# Patient Record
Sex: FEMALE | Race: WHITE | NOT HISPANIC OR LATINO | Employment: FULL TIME | URBAN - METROPOLITAN AREA
[De-identification: names, ages, dates, MRNs, and addresses within clinical notes are randomized per-mention and may not be internally consistent; named-entity substitution may affect disease eponyms.]

---

## 2023-02-04 PROBLEM — R22.0 FACIAL SWELLING: Status: ACTIVE | Noted: 2023-02-04

## 2023-02-04 PROBLEM — M25.50 ARTHRALGIA OF MULTIPLE JOINTS: Status: ACTIVE | Noted: 2023-02-04

## 2023-02-04 PROBLEM — M62.561 MUSCLE WASTING AND ATROPHY, NOT ELSEWHERE CLASSIFIED, RIGHT LOWER LEG: Status: ACTIVE | Noted: 2023-02-04

## 2023-02-04 PROBLEM — R22.0 TONGUE SWELLING: Status: ACTIVE | Noted: 2023-02-04

## 2023-02-04 PROBLEM — F40.298 FEAR OF FALLING: Status: ACTIVE | Noted: 2023-02-04

## 2023-02-04 PROBLEM — M54.50 LOW BACK PAIN: Status: ACTIVE | Noted: 2023-02-04

## 2023-02-04 RX ORDER — PREDNISONE 10 MG/1
TABLET ORAL
COMMUNITY
Start: 2022-09-12 | End: 2023-03-20 | Stop reason: ALTCHOICE

## 2023-02-04 RX ORDER — MULTIVITAMIN
1 TABLET ORAL DAILY
COMMUNITY
Start: 2022-03-17

## 2023-03-19 ASSESSMENT — PROMIS GLOBAL HEALTH SCALE
RATE_AVERAGE_PAIN: 0
RATE_SOCIAL_SATISFACTION: EXCELLENT
CARRYOUT_PHYSICAL_ACTIVITIES: COMPLETELY
RATE_PHYSICAL_HEALTH: VERY GOOD
EMOTIONAL_PROBLEMS: RARELY
CARRYOUT_SOCIAL_ACTIVITIES: EXCELLENT
RATE_GENERAL_HEALTH: VERY GOOD
RATE_QUALITY_OF_LIFE: VERY GOOD
RATE_MENTAL_HEALTH: EXCELLENT

## 2023-03-20 ENCOUNTER — OFFICE VISIT (OUTPATIENT)
Dept: PRIMARY CARE | Facility: CLINIC | Age: 58
End: 2023-03-20
Payer: COMMERCIAL

## 2023-03-20 ENCOUNTER — LAB (OUTPATIENT)
Dept: LAB | Facility: LAB | Age: 58
End: 2023-03-20
Payer: COMMERCIAL

## 2023-03-20 VITALS
WEIGHT: 128 LBS | BODY MASS INDEX: 21.85 KG/M2 | SYSTOLIC BLOOD PRESSURE: 118 MMHG | HEART RATE: 68 BPM | DIASTOLIC BLOOD PRESSURE: 72 MMHG | HEIGHT: 64 IN | OXYGEN SATURATION: 100 % | RESPIRATION RATE: 16 BRPM | TEMPERATURE: 97.4 F

## 2023-03-20 DIAGNOSIS — Z00.00 HEALTH CARE MAINTENANCE: ICD-10-CM

## 2023-03-20 DIAGNOSIS — Z12.31 ENCOUNTER FOR SCREENING MAMMOGRAM FOR MALIGNANT NEOPLASM OF BREAST: ICD-10-CM

## 2023-03-20 PROBLEM — M25.50 ARTHRALGIA OF MULTIPLE JOINTS: Status: RESOLVED | Noted: 2023-02-04 | Resolved: 2023-03-20

## 2023-03-20 PROBLEM — F40.298 FEAR OF FALLING: Status: RESOLVED | Noted: 2023-02-04 | Resolved: 2023-03-20

## 2023-03-20 PROBLEM — R22.0 TONGUE SWELLING: Status: RESOLVED | Noted: 2023-02-04 | Resolved: 2023-03-20

## 2023-03-20 PROBLEM — M54.50 LOW BACK PAIN: Status: RESOLVED | Noted: 2023-02-04 | Resolved: 2023-03-20

## 2023-03-20 PROBLEM — R22.0 FACIAL SWELLING: Status: RESOLVED | Noted: 2023-02-04 | Resolved: 2023-03-20

## 2023-03-20 PROBLEM — M62.561 MUSCLE WASTING AND ATROPHY, NOT ELSEWHERE CLASSIFIED, RIGHT LOWER LEG: Status: RESOLVED | Noted: 2023-02-04 | Resolved: 2023-03-20

## 2023-03-20 LAB
ALANINE AMINOTRANSFERASE (SGPT) (U/L) IN SER/PLAS: 14 U/L (ref 7–45)
ALBUMIN (G/DL) IN SER/PLAS: 4.5 G/DL (ref 3.4–5)
ALKALINE PHOSPHATASE (U/L) IN SER/PLAS: 89 U/L (ref 33–110)
ANION GAP IN SER/PLAS: 11 MMOL/L (ref 10–20)
ASPARTATE AMINOTRANSFERASE (SGOT) (U/L) IN SER/PLAS: 18 U/L (ref 9–39)
BILIRUBIN TOTAL (MG/DL) IN SER/PLAS: 0.4 MG/DL (ref 0–1.2)
CALCIDIOL (25 OH VITAMIN D3) (NG/ML) IN SER/PLAS: 38 NG/ML
CALCIUM (MG/DL) IN SER/PLAS: 9.7 MG/DL (ref 8.6–10.3)
CARBON DIOXIDE, TOTAL (MMOL/L) IN SER/PLAS: 27 MMOL/L (ref 21–32)
CHLORIDE (MMOL/L) IN SER/PLAS: 107 MMOL/L (ref 98–107)
CHOLESTEROL (MG/DL) IN SER/PLAS: 194 MG/DL (ref 0–199)
CHOLESTEROL IN HDL (MG/DL) IN SER/PLAS: 79.9 MG/DL
CHOLESTEROL/HDL RATIO: 2.4
COBALAMIN (VITAMIN B12) (PG/ML) IN SER/PLAS: 313 PG/ML (ref 211–911)
CREATININE (MG/DL) IN SER/PLAS: 0.81 MG/DL (ref 0.5–1.05)
ERYTHROCYTE DISTRIBUTION WIDTH (RATIO) BY AUTOMATED COUNT: 15.1 % (ref 11.5–14.5)
ERYTHROCYTE MEAN CORPUSCULAR HEMOGLOBIN CONCENTRATION (G/DL) BY AUTOMATED: 32.2 G/DL (ref 32–36)
ERYTHROCYTE MEAN CORPUSCULAR VOLUME (FL) BY AUTOMATED COUNT: 92 FL (ref 80–100)
ERYTHROCYTES (10*6/UL) IN BLOOD BY AUTOMATED COUNT: 4.28 X10E12/L (ref 4–5.2)
GFR FEMALE: 83 ML/MIN/1.73M2
GLUCOSE (MG/DL) IN SER/PLAS: 90 MG/DL (ref 74–99)
HEMATOCRIT (%) IN BLOOD BY AUTOMATED COUNT: 39.4 % (ref 36–46)
HEMOGLOBIN (G/DL) IN BLOOD: 12.7 G/DL (ref 12–16)
LDL: 92 MG/DL (ref 0–99)
LEUKOCYTES (10*3/UL) IN BLOOD BY AUTOMATED COUNT: 7.4 X10E9/L (ref 4.4–11.3)
PLATELETS (10*3/UL) IN BLOOD AUTOMATED COUNT: 342 X10E9/L (ref 150–450)
POTASSIUM (MMOL/L) IN SER/PLAS: 4.6 MMOL/L (ref 3.5–5.3)
PROTEIN TOTAL: 7 G/DL (ref 6.4–8.2)
SODIUM (MMOL/L) IN SER/PLAS: 140 MMOL/L (ref 136–145)
THYROTROPIN (MIU/L) IN SER/PLAS BY DETECTION LIMIT <= 0.05 MIU/L: 2.15 MIU/L (ref 0.44–3.98)
TRIGLYCERIDE (MG/DL) IN SER/PLAS: 110 MG/DL (ref 0–149)
UREA NITROGEN (MG/DL) IN SER/PLAS: 16 MG/DL (ref 6–23)
VLDL: 22 MG/DL (ref 0–40)

## 2023-03-20 PROCEDURE — 1036F TOBACCO NON-USER: CPT | Performed by: INTERNAL MEDICINE

## 2023-03-20 PROCEDURE — 80053 COMPREHEN METABOLIC PANEL: CPT

## 2023-03-20 PROCEDURE — 99396 PREV VISIT EST AGE 40-64: CPT | Performed by: INTERNAL MEDICINE

## 2023-03-20 PROCEDURE — 80061 LIPID PANEL: CPT

## 2023-03-20 PROCEDURE — 82607 VITAMIN B-12: CPT

## 2023-03-20 PROCEDURE — 82306 VITAMIN D 25 HYDROXY: CPT

## 2023-03-20 PROCEDURE — 84443 ASSAY THYROID STIM HORMONE: CPT

## 2023-03-20 PROCEDURE — 85027 COMPLETE CBC AUTOMATED: CPT

## 2023-03-20 PROCEDURE — 36415 COLL VENOUS BLD VENIPUNCTURE: CPT

## 2023-03-20 ASSESSMENT — ENCOUNTER SYMPTOMS
ABDOMINAL PAIN: 0
DYSURIA: 0
SHORTNESS OF BREATH: 0
RHINORRHEA: 0
UNEXPECTED WEIGHT CHANGE: 0
DIARRHEA: 0
FATIGUE: 0
WHEEZING: 0
BACK PAIN: 0
ARTHRALGIAS: 0
WEAKNESS: 0
EYE REDNESS: 0
FREQUENCY: 0
FEVER: 0
EYE PAIN: 0
SORE THROAT: 0
COUGH: 0
DIFFICULTY URINATING: 0
NAUSEA: 0
HEADACHES: 0
PSYCHIATRIC NEGATIVE: 1
CONSTIPATION: 0
EYE ITCHING: 0
PALPITATIONS: 0

## 2023-03-20 NOTE — PROGRESS NOTES
"Subjective   Lindsay James is a 57 y.o. female who presents for Annual Exam.    HPI   Mammogram 4/2022  Pap 2021  Colonoscopy 2021  Influenza declines  Covid 2021 x3  Shingrix 2019, 2020  Tdap 2014    Depression screen 23  Eye exam 23  Bmi 21  Review of Systems   Constitutional:  Negative for fatigue, fever and unexpected weight change.   HENT:  Negative for congestion, ear pain, rhinorrhea and sore throat.    Eyes:  Negative for pain, redness and itching.   Respiratory:  Negative for cough, shortness of breath and wheezing.    Cardiovascular:  Negative for chest pain, palpitations and leg swelling.   Gastrointestinal:  Negative for abdominal pain, constipation, diarrhea and nausea.   Genitourinary:  Negative for difficulty urinating, dysuria and frequency.   Musculoskeletal:  Negative for arthralgias and back pain.   Allergic/Immunologic: Negative for environmental allergies, food allergies and immunocompromised state.   Neurological:  Negative for weakness and headaches.   Psychiatric/Behavioral: Negative.     All other systems reviewed and are negative.      Health Maintenance Due   Topic Date Due    Yearly Adult Physical  Never done    Hepatitis B Vaccines (1 of 3 - 3-dose series) Never done    HIV Screening  Never done    MMR Vaccines (1 of 1 - Standard series) Never done    Hepatitis C Screening  Never done    Diabetes Screening  Never done    Cervical Cancer Screening  Never done    DTaP/Tdap/Td Vaccines (2 - Td or Tdap) 10/27/2014    Influenza Vaccine (1) Never done    Mammogram  04/01/2023       Objective   /72   Pulse 68   Temp 36.3 °C (97.4 °F)   Resp 16   Ht 1.626 m (5' 4\")   Wt 58.1 kg (128 lb)   SpO2 100%   BMI 21.97 kg/m²     Physical Exam  Vitals and nursing note reviewed.   Constitutional:       Appearance: Normal appearance.   HENT:      Head: Normocephalic.   Eyes:      Conjunctiva/sclera: Conjunctivae normal.      Pupils: Pupils are equal, round, and reactive to light. "   Cardiovascular:      Rate and Rhythm: Normal rate and regular rhythm.      Pulses: Normal pulses.      Heart sounds: Normal heart sounds.   Pulmonary:      Effort: Pulmonary effort is normal.      Breath sounds: Normal breath sounds.   Chest:   Breasts:     Right: Normal.      Left: Normal.   Musculoskeletal:         General: No swelling.      Cervical back: Neck supple.   Lymphadenopathy:      Upper Body:      Right upper body: No axillary adenopathy.      Left upper body: No axillary adenopathy.   Skin:     General: Skin is warm and dry.   Neurological:      General: No focal deficit present.      Mental Status: She is oriented to person, place, and time.         Assessment/Plan   Problem List Items Addressed This Visit    None  Visit Diagnoses       Encounter for screening mammogram for malignant neoplasm of breast        Relevant Orders    BI mammo bilateral screening tomosynthesis    Health care maintenance        Relevant Orders    CBC    Comprehensive Metabolic Panel    Lipid Panel    Thyroid Stimulating Hormone    Vitamin B12    Vitamin D, Total    Follow Up In [facility] - PCP

## 2023-03-21 ENCOUNTER — TELEPHONE (OUTPATIENT)
Dept: PRIMARY CARE | Facility: CLINIC | Age: 58
End: 2023-03-21

## 2023-03-21 NOTE — TELEPHONE ENCOUNTER
Left vm to make aware and call back w/any questions or concerns.    ----- Message from Mehnaz Mccoy DO sent at 3/21/2023 11:13 AM EDT -----  Call patient labs look great

## 2023-04-17 ENCOUNTER — TELEPHONE (OUTPATIENT)
Dept: PRIMARY CARE | Facility: CLINIC | Age: 58
End: 2023-04-17

## 2023-04-17 NOTE — TELEPHONE ENCOUNTER
Pt made aware    ----- Message from Mehnaz Mccoy DO sent at 4/17/2023  1:28 PM EDT -----  Call patient mammogram is normal.

## 2024-03-20 ENCOUNTER — OFFICE VISIT (OUTPATIENT)
Dept: PRIMARY CARE | Facility: CLINIC | Age: 59
End: 2024-03-20
Payer: MEDICARE

## 2024-03-20 ENCOUNTER — LAB (OUTPATIENT)
Dept: LAB | Facility: LAB | Age: 59
End: 2024-03-20
Payer: MEDICARE

## 2024-03-20 VITALS
HEART RATE: 80 BPM | HEIGHT: 64 IN | DIASTOLIC BLOOD PRESSURE: 78 MMHG | SYSTOLIC BLOOD PRESSURE: 118 MMHG | WEIGHT: 131 LBS | RESPIRATION RATE: 16 BRPM | BODY MASS INDEX: 22.36 KG/M2 | OXYGEN SATURATION: 100 % | TEMPERATURE: 98.1 F

## 2024-03-20 DIAGNOSIS — Z00.00 HEALTH CARE MAINTENANCE: ICD-10-CM

## 2024-03-20 DIAGNOSIS — Z12.31 ENCOUNTER FOR SCREENING MAMMOGRAM FOR MALIGNANT NEOPLASM OF BREAST: ICD-10-CM

## 2024-03-20 DIAGNOSIS — T78.40XA ALLERGIC REACTION, INITIAL ENCOUNTER: ICD-10-CM

## 2024-03-20 DIAGNOSIS — T78.40XA ALLERGIC REACTION, INITIAL ENCOUNTER: Primary | ICD-10-CM

## 2024-03-20 LAB
25(OH)D3 SERPL-MCNC: 31 NG/ML (ref 30–100)
ALBUMIN SERPL BCP-MCNC: 4.5 G/DL (ref 3.4–5)
ALP SERPL-CCNC: 93 U/L (ref 33–136)
ALT SERPL W P-5'-P-CCNC: 17 U/L (ref 7–45)
ANION GAP SERPL CALC-SCNC: 10 MMOL/L (ref 10–20)
AST SERPL W P-5'-P-CCNC: 18 U/L (ref 9–39)
BILIRUB SERPL-MCNC: 0.4 MG/DL (ref 0–1.2)
BUN SERPL-MCNC: 15 MG/DL (ref 6–23)
CALCIUM SERPL-MCNC: 9.5 MG/DL (ref 8.6–10.3)
CHLORIDE SERPL-SCNC: 108 MMOL/L (ref 98–107)
CHOLEST SERPL-MCNC: 203 MG/DL (ref 0–199)
CHOLESTEROL/HDL RATIO: 2.6
CO2 SERPL-SCNC: 28 MMOL/L (ref 21–32)
CREAT SERPL-MCNC: 0.81 MG/DL (ref 0.5–1.05)
EGFRCR SERPLBLD CKD-EPI 2021: 83 ML/MIN/1.73M*2
ERYTHROCYTE [DISTWIDTH] IN BLOOD BY AUTOMATED COUNT: 15.4 % (ref 11.5–14.5)
GLUCOSE SERPL-MCNC: 87 MG/DL (ref 74–99)
HCT VFR BLD AUTO: 37.1 % (ref 36–46)
HDLC SERPL-MCNC: 77.2 MG/DL
HGB BLD-MCNC: 12.3 G/DL (ref 12–16)
LDLC SERPL CALC-MCNC: 96 MG/DL
MCH RBC QN AUTO: 30.1 PG (ref 26–34)
MCHC RBC AUTO-ENTMCNC: 33.2 G/DL (ref 32–36)
MCV RBC AUTO: 91 FL (ref 80–100)
NON HDL CHOLESTEROL: 126 MG/DL (ref 0–149)
NRBC BLD-RTO: 0 /100 WBCS (ref 0–0)
PLATELET # BLD AUTO: 316 X10*3/UL (ref 150–450)
POTASSIUM SERPL-SCNC: 4.2 MMOL/L (ref 3.5–5.3)
PROT SERPL-MCNC: 7 G/DL (ref 6.4–8.2)
RBC # BLD AUTO: 4.08 X10*6/UL (ref 4–5.2)
SODIUM SERPL-SCNC: 142 MMOL/L (ref 136–145)
TRIGL SERPL-MCNC: 151 MG/DL (ref 0–149)
TSH SERPL-ACNC: 1.85 MIU/L (ref 0.44–3.98)
VIT B12 SERPL-MCNC: 348 PG/ML (ref 211–911)
VLDL: 30 MG/DL (ref 0–40)
WBC # BLD AUTO: 6.7 X10*3/UL (ref 4.4–11.3)

## 2024-03-20 PROCEDURE — 82607 VITAMIN B-12: CPT

## 2024-03-20 PROCEDURE — 99396 PREV VISIT EST AGE 40-64: CPT | Performed by: INTERNAL MEDICINE

## 2024-03-20 PROCEDURE — 82306 VITAMIN D 25 HYDROXY: CPT

## 2024-03-20 PROCEDURE — 84443 ASSAY THYROID STIM HORMONE: CPT

## 2024-03-20 PROCEDURE — 83520 IMMUNOASSAY QUANT NOS NONAB: CPT

## 2024-03-20 PROCEDURE — 85027 COMPLETE CBC AUTOMATED: CPT

## 2024-03-20 PROCEDURE — 80053 COMPREHEN METABOLIC PANEL: CPT

## 2024-03-20 PROCEDURE — 36415 COLL VENOUS BLD VENIPUNCTURE: CPT

## 2024-03-20 PROCEDURE — 1036F TOBACCO NON-USER: CPT | Performed by: INTERNAL MEDICINE

## 2024-03-20 PROCEDURE — 80061 LIPID PANEL: CPT

## 2024-03-20 RX ORDER — METHYLPREDNISOLONE 4 MG/1
TABLET ORAL
Qty: 21 TABLET | Refills: 0 | Status: SHIPPED | OUTPATIENT
Start: 2024-03-20 | End: 2024-03-27

## 2024-03-20 RX ORDER — PREDNISONE 50 MG/1
50 TABLET ORAL 2 TIMES DAILY
Qty: 20 TABLET | Refills: 0 | Status: SHIPPED | OUTPATIENT
Start: 2024-03-20 | End: 2024-03-30

## 2024-03-20 ASSESSMENT — PATIENT HEALTH QUESTIONNAIRE - PHQ9
SUM OF ALL RESPONSES TO PHQ9 QUESTIONS 1 AND 2: 0
1. LITTLE INTEREST OR PLEASURE IN DOING THINGS: NOT AT ALL
2. FEELING DOWN, DEPRESSED OR HOPELESS: NOT AT ALL

## 2024-03-20 ASSESSMENT — ENCOUNTER SYMPTOMS
FATIGUE: 0
FEVER: 0
SHORTNESS OF BREATH: 0
COUGH: 0

## 2024-03-20 NOTE — PROGRESS NOTES
"Subjective   Lindsay James is a 58 y.o. female who presents for Annual Exam.    HPI   Influenza declines  Covid 2021  Shingrix 2019, 2020  Tdap 2014  Mammogram 3/2023  Pap 2021  Colonoscopy 2021 5 years  Bmi 22  Eye exam 24  Depression screen 24    Pt reports h/o allergy w/ root canal at Dentist.  Tongue swelling.  Takes Prednisone prior to visits.  Has upcoming appt for possible root canal.    Review of Systems   Constitutional:  Negative for fatigue and fever.   Respiratory:  Negative for cough and shortness of breath.    Cardiovascular:  Negative for chest pain and leg swelling.   All other systems reviewed and are negative.      Health Maintenance Due   Topic Date Due    Yearly Adult Physical  Never done    HIV Screening  Never done    MMR Vaccines (1 of 1 - Standard series) Never done    Hepatitis C Screening  Never done    Diabetes Screening  Never done    Influenza Vaccine (1) Never done    COVID-19 Vaccine (4 - 2023-24 season) 09/01/2023    Cervical Cancer Screening  03/16/2024    Mammogram  04/05/2024       Objective   /78   Pulse 80   Temp 36.7 °C (98.1 °F)   Resp 16   Ht 1.626 m (5' 4\")   Wt 59.4 kg (131 lb)   SpO2 100%   BMI 22.49 kg/m²     Physical Exam  Vitals and nursing note reviewed.   Constitutional:       Appearance: Normal appearance.   HENT:      Head: Normocephalic.   Eyes:      Conjunctiva/sclera: Conjunctivae normal.      Pupils: Pupils are equal, round, and reactive to light.   Cardiovascular:      Rate and Rhythm: Normal rate and regular rhythm.      Pulses: Normal pulses.      Heart sounds: Normal heart sounds.   Pulmonary:      Effort: Pulmonary effort is normal.      Breath sounds: Normal breath sounds.   Musculoskeletal:         General: No swelling.      Cervical back: Neck supple.   Skin:     General: Skin is warm and dry.   Neurological:      General: No focal deficit present.      Mental Status: She is oriented to person, place, and time.         Assessment/Plan "   Problem List Items Addressed This Visit    None  Visit Diagnoses       Allergic reaction, initial encounter    -  Primary    Relevant Medications    predniSONE (Deltasone) 50 mg tablet    methylPREDNISolone (Medrol Dospak) 4 mg tablets    Other Relevant Orders    Tryptase    Health care maintenance        Relevant Orders    CBC    Comprehensive Metabolic Panel    Lipid Panel    Thyroid Stimulating Hormone    Vitamin B12    Vitamin D 25-Hydroxy,Total (for eval of Vitamin D levels)    Encounter for screening mammogram for malignant neoplasm of breast        Relevant Orders    BI mammo bilateral screening tomosynthesis          Reviewed dr chicas note for prophylaxis for dental work  Advised pt to contact dr chicas to let her know that she has upcoming dental work and pcp rx med in case she wants her to follow up or do further testing

## 2024-03-22 LAB — TRYPTASE SERPL-MCNC: 5.2 UG/L

## 2024-03-25 ENCOUNTER — TELEPHONE (OUTPATIENT)
Dept: PRIMARY CARE | Facility: CLINIC | Age: 59
End: 2024-03-25
Payer: MEDICARE

## 2024-03-25 NOTE — TELEPHONE ENCOUNTER
----- Message from Mehnaz Mccoy DO sent at 3/25/2024  5:16 PM EDT -----  Call patient labs look great

## 2024-04-04 ENCOUNTER — APPOINTMENT (OUTPATIENT)
Dept: RADIOLOGY | Facility: CLINIC | Age: 59
End: 2024-04-04
Payer: MEDICARE

## 2024-04-08 ENCOUNTER — APPOINTMENT (OUTPATIENT)
Dept: RADIOLOGY | Facility: CLINIC | Age: 59
End: 2024-04-08
Payer: MEDICARE

## 2024-04-09 ENCOUNTER — HOSPITAL ENCOUNTER (OUTPATIENT)
Dept: RADIOLOGY | Facility: CLINIC | Age: 59
Discharge: HOME | End: 2024-04-09
Payer: MEDICARE

## 2024-04-09 VITALS — WEIGHT: 130 LBS | HEIGHT: 64 IN | BODY MASS INDEX: 22.2 KG/M2

## 2024-04-09 DIAGNOSIS — Z12.31 ENCOUNTER FOR SCREENING MAMMOGRAM FOR MALIGNANT NEOPLASM OF BREAST: ICD-10-CM

## 2024-04-09 PROCEDURE — 77063 BREAST TOMOSYNTHESIS BI: CPT | Performed by: RADIOLOGY

## 2024-04-09 PROCEDURE — 77067 SCR MAMMO BI INCL CAD: CPT | Performed by: RADIOLOGY

## 2024-04-09 PROCEDURE — 77067 SCR MAMMO BI INCL CAD: CPT

## 2024-04-12 ENCOUNTER — TELEPHONE (OUTPATIENT)
Dept: PRIMARY CARE | Facility: CLINIC | Age: 59
End: 2024-04-12
Payer: MEDICARE

## 2024-04-12 NOTE — TELEPHONE ENCOUNTER
----- Message from Mehnaz Mccoy DO sent at 4/12/2024 11:13 AM EDT -----  Call patient her mamm was read as incomplete  In her situation, everything they saw was normal but there was one area that was incompletely evaluated and they want her to come back at no expense to her to complete imaging  Have they been in contact with her

## 2024-04-12 NOTE — RESULT ENCOUNTER NOTE
Call patient her mamm was read as incomplete  In her situation, everything they saw was normal but there was one area that was incompletely evaluated and they want her to come back at no expense to her to complete imaging  Have they been in contact with her

## 2024-04-18 ENCOUNTER — TELEPHONE (OUTPATIENT)
Dept: PRIMARY CARE | Facility: CLINIC | Age: 59
End: 2024-04-18
Payer: MEDICARE

## 2024-04-18 DIAGNOSIS — Z12.31 ENCOUNTER FOR SCREENING MAMMOGRAM FOR MALIGNANT NEOPLASM OF BREAST: Primary | ICD-10-CM

## 2024-04-18 NOTE — TELEPHONE ENCOUNTER
Patient calling requesting new order for mammogram.  She received a letter to repeat at no expense.

## 2024-04-30 ENCOUNTER — TELEPHONE (OUTPATIENT)
Dept: PRIMARY CARE | Facility: CLINIC | Age: 59
End: 2024-04-30
Payer: MEDICARE

## 2024-04-30 ENCOUNTER — HOSPITAL ENCOUNTER (OUTPATIENT)
Dept: RADIOLOGY | Facility: HOSPITAL | Age: 59
Discharge: HOME | End: 2024-04-30
Payer: MEDICARE

## 2024-04-30 DIAGNOSIS — Z12.31 ENCOUNTER FOR SCREENING MAMMOGRAM FOR MALIGNANT NEOPLASM OF BREAST: ICD-10-CM

## 2024-04-30 PROCEDURE — 77066 DX MAMMO INCL CAD BI: CPT | Mod: RSC | Performed by: STUDENT IN AN ORGANIZED HEALTH CARE EDUCATION/TRAINING PROGRAM

## 2024-04-30 PROCEDURE — G0279 TOMOSYNTHESIS, MAMMO: HCPCS | Mod: RSC | Performed by: STUDENT IN AN ORGANIZED HEALTH CARE EDUCATION/TRAINING PROGRAM

## 2024-04-30 PROCEDURE — 77062 BREAST TOMOSYNTHESIS BI: CPT | Mod: RSC

## 2024-04-30 NOTE — TELEPHONE ENCOUNTER
----- Message from Mehnaz Mccoy DO sent at 4/30/2024  4:14 PM EDT -----  Call patient mammogram is normal.

## 2024-10-14 ENCOUNTER — TELEPHONE (OUTPATIENT)
Dept: PRIMARY CARE | Facility: CLINIC | Age: 59
End: 2024-10-14
Payer: MEDICARE

## 2024-10-14 DIAGNOSIS — T78.40XD ALLERGIC REACTION, SUBSEQUENT ENCOUNTER: Primary | ICD-10-CM

## 2024-10-14 RX ORDER — METHYLPREDNISOLONE 4 MG/1
TABLET ORAL
Qty: 21 TABLET | Refills: 0 | Status: SHIPPED | OUTPATIENT
Start: 2024-10-14 | End: 2024-10-21

## 2024-10-14 NOTE — TELEPHONE ENCOUNTER
Needs refilled:  methylPREDNISolone (Medrol Dospak) 4 mg tablets   [facility] [employer] [id number] - [place], [place] - [address] [address], [facility] [address]  Phone: [phone]  Fax: [phone]

## 2025-03-24 ENCOUNTER — APPOINTMENT (OUTPATIENT)
Dept: PRIMARY CARE | Facility: CLINIC | Age: 60
End: 2025-03-24
Payer: MEDICARE

## 2025-03-31 ENCOUNTER — APPOINTMENT (OUTPATIENT)
Dept: PRIMARY CARE | Facility: CLINIC | Age: 60
End: 2025-03-31
Payer: MEDICARE

## 2025-03-31 VITALS
WEIGHT: 138 LBS | TEMPERATURE: 98 F | OXYGEN SATURATION: 100 % | HEART RATE: 72 BPM | BODY MASS INDEX: 23.56 KG/M2 | SYSTOLIC BLOOD PRESSURE: 118 MMHG | RESPIRATION RATE: 16 BRPM | DIASTOLIC BLOOD PRESSURE: 68 MMHG | HEIGHT: 64 IN

## 2025-03-31 DIAGNOSIS — Z00.00 HEALTH CARE MAINTENANCE: Primary | ICD-10-CM

## 2025-03-31 DIAGNOSIS — Z12.31 ENCOUNTER FOR SCREENING MAMMOGRAM FOR MALIGNANT NEOPLASM OF BREAST: ICD-10-CM

## 2025-03-31 PROCEDURE — 3008F BODY MASS INDEX DOCD: CPT | Performed by: INTERNAL MEDICINE

## 2025-03-31 PROCEDURE — 1036F TOBACCO NON-USER: CPT | Performed by: INTERNAL MEDICINE

## 2025-03-31 PROCEDURE — 99396 PREV VISIT EST AGE 40-64: CPT | Performed by: INTERNAL MEDICINE

## 2025-03-31 ASSESSMENT — ENCOUNTER SYMPTOMS
UNEXPECTED WEIGHT CHANGE: 0
ABDOMINAL PAIN: 0
EYE PAIN: 0
EYE ITCHING: 0
PSYCHIATRIC NEGATIVE: 1
PALPITATIONS: 0
SHORTNESS OF BREATH: 0
EYE REDNESS: 0
SORE THROAT: 0
HEADACHES: 0
COUGH: 0
NAUSEA: 0
FEVER: 0
BACK PAIN: 0
RHINORRHEA: 0
DYSURIA: 0
WHEEZING: 0
ARTHRALGIAS: 0
WEAKNESS: 0
FREQUENCY: 0
DIFFICULTY URINATING: 0
CONSTIPATION: 0
FATIGUE: 0
DIARRHEA: 0

## 2025-03-31 ASSESSMENT — PATIENT HEALTH QUESTIONNAIRE - PHQ9
2. FEELING DOWN, DEPRESSED OR HOPELESS: NOT AT ALL
1. LITTLE INTEREST OR PLEASURE IN DOING THINGS: NOT AT ALL
SUM OF ALL RESPONSES TO PHQ9 QUESTIONS 1 AND 2: 0

## 2025-03-31 NOTE — PROGRESS NOTES
"Subjective   Lindsay James is a 59 y.o. female who presents for Annual Exam.    HPI   Influenza declines  Covid 2021  Shingrix 2019, 2020  Tdap 2014  Mammogram 4/2024  Pap 2021  Colonoscopy 2021 5 year fu  Bmi 23  Eye exam due  Depression screen 25    Review of Systems   Constitutional:  Negative for fatigue, fever and unexpected weight change.   HENT:  Negative for congestion, ear pain, rhinorrhea and sore throat.    Eyes:  Negative for pain, redness and itching.   Respiratory:  Negative for cough, shortness of breath and wheezing.    Cardiovascular:  Negative for chest pain, palpitations and leg swelling.   Gastrointestinal:  Negative for abdominal pain, constipation, diarrhea and nausea.   Genitourinary:  Negative for difficulty urinating, dysuria and frequency.   Musculoskeletal:  Negative for arthralgias and back pain.   Allergic/Immunologic: Negative for environmental allergies, food allergies and immunocompromised state.   Neurological:  Negative for weakness and headaches.   Psychiatric/Behavioral: Negative.     All other systems reviewed and are negative.      Health Maintenance Due   Topic Date Due    Yearly Adult Physical  Never done    HIV Screening  Never done    MMR Vaccines (1 of 1 - Standard series) Never done    Hepatitis C Screening  Never done    Diabetes Screening  Never done    Pneumococcal Vaccine (1 of 1 - PCV) Never done    Cervical Cancer Screening  03/16/2024    Influenza Vaccine (1) Never done    COVID-19 Vaccine (4 - 2024-25 season) 09/01/2024    DTaP/Tdap/Td Vaccines (2 - Td or Tdap) 09/29/2024    Mammogram  04/30/2025       Objective   /68   Pulse 72   Temp 36.7 °C (98 °F)   Resp 16   Ht 1.626 m (5' 4\")   Wt 62.6 kg (138 lb)   SpO2 100%   BMI 23.69 kg/m²     Physical Exam  Vitals and nursing note reviewed.   Constitutional:       Appearance: Normal appearance.   HENT:      Head: Normocephalic.   Eyes:      Conjunctiva/sclera: Conjunctivae normal.      Pupils: Pupils " are equal, round, and reactive to light.   Cardiovascular:      Rate and Rhythm: Normal rate and regular rhythm.      Pulses: Normal pulses.      Heart sounds: Normal heart sounds.   Pulmonary:      Effort: Pulmonary effort is normal.      Breath sounds: Normal breath sounds.   Chest:   Breasts:     Right: Normal.      Left: Normal.   Musculoskeletal:         General: No swelling.      Cervical back: Neck supple.   Skin:     General: Skin is warm and dry.   Neurological:      General: No focal deficit present.      Mental Status: She is oriented to person, place, and time.         Assessment/Plan   Problem List Items Addressed This Visit    None  Visit Diagnoses       Health care maintenance    -  Primary    Relevant Orders    CBC    Comprehensive Metabolic Panel    Lipid Panel    Thyroid Stimulating Hormone    Vitamin B12    Vitamin D 25-Hydroxy,Total (for eval of Vitamin D levels)    Encounter for screening mammogram for malignant neoplasm of breast        Relevant Orders    BI mammo bilateral screening tomosynthesis

## 2025-04-01 LAB
25(OH)D3+25(OH)D2 SERPL-MCNC: 31 NG/ML (ref 30–100)
ALBUMIN SERPL-MCNC: 4.7 G/DL (ref 3.6–5.1)
ALP SERPL-CCNC: 91 U/L (ref 37–153)
ALT SERPL-CCNC: 14 U/L (ref 6–29)
ANION GAP SERPL CALCULATED.4IONS-SCNC: 6 MMOL/L (CALC) (ref 7–17)
AST SERPL-CCNC: 18 U/L (ref 10–35)
BILIRUB SERPL-MCNC: 0.6 MG/DL (ref 0.2–1.2)
BUN SERPL-MCNC: 18 MG/DL (ref 7–25)
CALCIUM SERPL-MCNC: 9.6 MG/DL (ref 8.6–10.4)
CHLORIDE SERPL-SCNC: 108 MMOL/L (ref 98–110)
CHOLEST SERPL-MCNC: 203 MG/DL
CHOLEST/HDLC SERPL: 2.4 (CALC)
CO2 SERPL-SCNC: 25 MMOL/L (ref 20–32)
CREAT SERPL-MCNC: 0.75 MG/DL (ref 0.5–1.05)
EGFRCR SERPLBLD CKD-EPI 2021: 91 ML/MIN/1.73M2
ERYTHROCYTE [DISTWIDTH] IN BLOOD BY AUTOMATED COUNT: 13.2 % (ref 11–15)
GLUCOSE SERPL-MCNC: 87 MG/DL (ref 65–139)
HCT VFR BLD AUTO: 36.6 % (ref 35–45)
HDLC SERPL-MCNC: 85 MG/DL
HGB BLD-MCNC: 12.1 G/DL (ref 11.7–15.5)
LDLC SERPL CALC-MCNC: 104 MG/DL (CALC)
MCH RBC QN AUTO: 29.9 PG (ref 27–33)
MCHC RBC AUTO-ENTMCNC: 33.1 G/DL (ref 32–36)
MCV RBC AUTO: 90.4 FL (ref 80–100)
NONHDLC SERPL-MCNC: 118 MG/DL (CALC)
PLATELET # BLD AUTO: 325 THOUSAND/UL (ref 140–400)
PMV BLD REES-ECKER: 10.3 FL (ref 7.5–12.5)
POTASSIUM SERPL-SCNC: 4.3 MMOL/L (ref 3.5–5.3)
PROT SERPL-MCNC: 7.1 G/DL (ref 6.1–8.1)
RBC # BLD AUTO: 4.05 MILLION/UL (ref 3.8–5.1)
SODIUM SERPL-SCNC: 139 MMOL/L (ref 135–146)
TRIGL SERPL-MCNC: 56 MG/DL
TSH SERPL-ACNC: 1.73 MIU/L (ref 0.4–4.5)
VIT B12 SERPL-MCNC: 361 PG/ML (ref 200–1100)
WBC # BLD AUTO: 6.1 THOUSAND/UL (ref 3.8–10.8)

## 2025-04-10 ENCOUNTER — HOSPITAL ENCOUNTER (OUTPATIENT)
Dept: RADIOLOGY | Facility: CLINIC | Age: 60
Discharge: HOME | End: 2025-04-10
Payer: COMMERCIAL

## 2025-04-10 DIAGNOSIS — Z12.31 ENCOUNTER FOR SCREENING MAMMOGRAM FOR MALIGNANT NEOPLASM OF BREAST: ICD-10-CM

## 2025-04-10 PROCEDURE — 77063 BREAST TOMOSYNTHESIS BI: CPT

## 2025-04-10 PROCEDURE — 77067 SCR MAMMO BI INCL CAD: CPT | Performed by: RADIOLOGY

## 2025-04-10 PROCEDURE — 77063 BREAST TOMOSYNTHESIS BI: CPT | Performed by: RADIOLOGY

## 2026-04-01 ENCOUNTER — APPOINTMENT (OUTPATIENT)
Dept: PRIMARY CARE | Facility: CLINIC | Age: 61
End: 2026-04-01
Payer: COMMERCIAL